# Patient Record
Sex: FEMALE | Race: WHITE | NOT HISPANIC OR LATINO
[De-identification: names, ages, dates, MRNs, and addresses within clinical notes are randomized per-mention and may not be internally consistent; named-entity substitution may affect disease eponyms.]

---

## 2018-03-19 PROBLEM — Z00.00 ENCOUNTER FOR PREVENTIVE HEALTH EXAMINATION: Status: ACTIVE | Noted: 2018-03-19

## 2018-03-30 ENCOUNTER — APPOINTMENT (OUTPATIENT)
Dept: CARDIOLOGY | Facility: CLINIC | Age: 48
End: 2018-03-30

## 2018-03-30 ENCOUNTER — MEDICATION RENEWAL (OUTPATIENT)
Age: 48
End: 2018-03-30

## 2018-04-13 ENCOUNTER — RECORD ABSTRACTING (OUTPATIENT)
Age: 48
End: 2018-04-13

## 2018-04-13 DIAGNOSIS — Z86.79 PERSONAL HISTORY OF OTHER DISEASES OF THE CIRCULATORY SYSTEM: ICD-10-CM

## 2018-04-13 DIAGNOSIS — Z78.9 OTHER SPECIFIED HEALTH STATUS: ICD-10-CM

## 2018-04-17 ENCOUNTER — APPOINTMENT (OUTPATIENT)
Dept: CARDIOLOGY | Facility: CLINIC | Age: 48
End: 2018-04-17
Payer: COMMERCIAL

## 2018-04-17 VITALS
DIASTOLIC BLOOD PRESSURE: 90 MMHG | RESPIRATION RATE: 16 BRPM | BODY MASS INDEX: 31.34 KG/M2 | HEIGHT: 66 IN | HEART RATE: 62 BPM | SYSTOLIC BLOOD PRESSURE: 144 MMHG | WEIGHT: 195 LBS

## 2018-04-17 PROCEDURE — 93000 ELECTROCARDIOGRAM COMPLETE: CPT

## 2018-04-17 PROCEDURE — 99214 OFFICE O/P EST MOD 30 MIN: CPT

## 2018-04-20 ENCOUNTER — APPOINTMENT (OUTPATIENT)
Dept: CARDIOLOGY | Facility: CLINIC | Age: 48
End: 2018-04-20
Payer: COMMERCIAL

## 2018-04-20 PROCEDURE — 93306 TTE W/DOPPLER COMPLETE: CPT

## 2018-04-23 ENCOUNTER — APPOINTMENT (OUTPATIENT)
Dept: CARDIOLOGY | Facility: CLINIC | Age: 48
End: 2018-04-23
Payer: COMMERCIAL

## 2018-04-23 PROCEDURE — 93015 CV STRESS TEST SUPVJ I&R: CPT

## 2021-08-18 DIAGNOSIS — Z82.49 FAMILY HISTORY OF ISCHEMIC HEART DISEASE AND OTHER DISEASES OF THE CIRCULATORY SYSTEM: ICD-10-CM

## 2021-08-18 RX ORDER — LISINOPRIL 10 MG/1
10 TABLET ORAL
Qty: 90 | Refills: 1 | Status: DISCONTINUED | COMMUNITY
Start: 2018-03-19 | End: 2021-08-18

## 2021-09-08 ENCOUNTER — APPOINTMENT (OUTPATIENT)
Dept: CARDIOLOGY | Facility: CLINIC | Age: 51
End: 2021-09-08
Payer: COMMERCIAL

## 2021-09-08 VITALS
DIASTOLIC BLOOD PRESSURE: 80 MMHG | BODY MASS INDEX: 34.23 KG/M2 | HEART RATE: 67 BPM | SYSTOLIC BLOOD PRESSURE: 140 MMHG | HEIGHT: 66 IN | WEIGHT: 213 LBS | RESPIRATION RATE: 16 BRPM

## 2021-09-08 DIAGNOSIS — J45.909 UNSPECIFIED ASTHMA, UNCOMPLICATED: ICD-10-CM

## 2021-09-08 DIAGNOSIS — R79.89 OTHER SPECIFIED ABNORMAL FINDINGS OF BLOOD CHEMISTRY: ICD-10-CM

## 2021-09-08 PROCEDURE — 99204 OFFICE O/P NEW MOD 45 MIN: CPT

## 2021-09-08 PROCEDURE — 93000 ELECTROCARDIOGRAM COMPLETE: CPT

## 2021-09-10 PROBLEM — J45.909 ASTHMA: Status: ACTIVE | Noted: 2018-04-13

## 2021-09-23 ENCOUNTER — APPOINTMENT (OUTPATIENT)
Dept: CARDIOLOGY | Facility: CLINIC | Age: 51
End: 2021-09-23
Payer: COMMERCIAL

## 2021-09-23 PROCEDURE — 93306 TTE W/DOPPLER COMPLETE: CPT

## 2021-10-21 ENCOUNTER — APPOINTMENT (OUTPATIENT)
Dept: CARDIOLOGY | Facility: CLINIC | Age: 51
End: 2021-10-21
Payer: COMMERCIAL

## 2021-10-21 ENCOUNTER — NON-APPOINTMENT (OUTPATIENT)
Age: 51
End: 2021-10-21

## 2021-10-21 VITALS
HEIGHT: 66 IN | BODY MASS INDEX: 34.23 KG/M2 | WEIGHT: 213 LBS | RESPIRATION RATE: 16 BRPM | SYSTOLIC BLOOD PRESSURE: 128 MMHG | HEART RATE: 58 BPM | DIASTOLIC BLOOD PRESSURE: 70 MMHG

## 2021-10-21 PROCEDURE — 93000 ELECTROCARDIOGRAM COMPLETE: CPT | Mod: 59

## 2021-10-21 PROCEDURE — 99214 OFFICE O/P EST MOD 30 MIN: CPT

## 2021-10-21 NOTE — REASON FOR VISIT
[FreeTextEntry1] : Mrs. Read is a pleasant 51-year-old white female with a past medical history significant for hypertension, diet-controlled diabetes mellitus, asthma and a recent syncopal episode, who presents for follow up evaluation.

## 2021-10-21 NOTE — PHYSICAL EXAM
[Well Developed] : well developed [Well Nourished] : well nourished [No Acute Distress] : no acute distress [Obese] : obese [Normal Conjunctiva] : normal conjunctiva [Normal Venous Pressure] : normal venous pressure [No Carotid Bruit] : no carotid bruit [Normal S1, S2] : normal S1, S2 [No Rub] : no rub [S4] : S4 [Clear Lung Fields] : clear lung fields [No Respiratory Distress] : no respiratory distress  [Soft] : abdomen soft [Normal Bowel Sounds] : normal bowel sounds [Normal Gait] : normal gait [No Edema] : no edema [No Rash] : no rash [No Skin Lesions] : no skin lesions [Moves all extremities] : moves all extremities [No Focal Deficits] : no focal deficits [Normal Speech] : normal speech [Alert and Oriented] : alert and oriented [Normal memory] : normal memory [de-identified] : I/VI systolic murmur

## 2021-10-21 NOTE — REVIEW OF SYSTEMS
[Dyspnea on exertion] : dyspnea during exertion [Chest Discomfort] : chest discomfort [Palpitations] : palpitations [Negative] : Heme/Lymph

## 2021-10-21 NOTE — DISCUSSION/SUMMARY
[FreeTextEntry1] : 1 - Hypertension:  blood pressure well controlled on current medications.  Advised to follow low sodium diet.\par \par 2 - Syncope/palpitations/lightheadedness/chest discomfort:  complaints of intermittent lightheadedness, shortness of breath, palpitations 'fluttering" and mild chest discomfort.  Has not had any further syncope since her one episode .  States it all started after receiving her first COVID-19 vaccine.  She experienced a lot of her symptoms while wearing her ambulatory event monitor.  Most of her symptoms occurred at rest.\par \par Echocardiogram:  EF of 60-65%. Pseudonormal pattern of LV diastolic filling.  Mild aortic valve sclerosis without stenosis.  Trace mitral valve regurgitation.  Mild mitral annular calcification.\par \par 30-day ambulatory event monitor:  Sinus rhythm with average HR of 70 bpm (max 120, min 50).  No atrial fibrillation, SVT, VT, pauses or heart blocks.  PVC burden <1%, PAC burden <1%.  \par \par Carotid duplex:  Normal exam without atheromatous findings or sonographic evidence for hemodynamically significant internal carotid artery stenosis.\par \par Will schedule Mrs. Read for an exercise stress test.  Advised to decrease caffeine intake, increase hydration, get adequate sleep, avoid standing for long periods of time and get up slowly.\par \par 3 - Follow up with Dr. Holder after testing is completed.

## 2021-10-21 NOTE — HISTORY OF PRESENT ILLNESS
[FreeTextEntry1] : Mrs. Read presents today with complaints of intermittent lightheadedness, shortness of breath, palpitations 'fluttering" and mild chest discomfort.  Has not had any further syncope since her one episode .  States it all started after receiving her first COVID-19 vaccine.  She experienced a lot of her symptoms while wearing her ambulatory event monitor.  Most of her symptoms occurred at rest.  Her PCP recently had her undergo carotid duplex which she had done at [facility].

## 2021-10-21 NOTE — CARDIOLOGY SUMMARY
[de-identified] : Sinus bradycardia at 58 bpm.  Normal intervals.  Low voltage.  No evidence of ischemia.

## 2021-12-01 ENCOUNTER — APPOINTMENT (OUTPATIENT)
Dept: CARDIOLOGY | Facility: CLINIC | Age: 51
End: 2021-12-01

## 2021-12-07 ENCOUNTER — RX RENEWAL (OUTPATIENT)
Age: 51
End: 2021-12-07

## 2021-12-23 ENCOUNTER — APPOINTMENT (OUTPATIENT)
Dept: CARDIOLOGY | Facility: CLINIC | Age: 51
End: 2021-12-23

## 2022-01-11 ENCOUNTER — APPOINTMENT (OUTPATIENT)
Dept: CARDIOLOGY | Facility: CLINIC | Age: 52
End: 2022-01-11

## 2022-01-24 ENCOUNTER — APPOINTMENT (OUTPATIENT)
Dept: CARDIOLOGY | Facility: CLINIC | Age: 52
End: 2022-01-24

## 2022-02-22 ENCOUNTER — APPOINTMENT (OUTPATIENT)
Dept: CARDIOLOGY | Facility: CLINIC | Age: 52
End: 2022-02-22
Payer: COMMERCIAL

## 2022-02-22 ENCOUNTER — NON-APPOINTMENT (OUTPATIENT)
Age: 52
End: 2022-02-22

## 2022-02-22 VITALS — DIASTOLIC BLOOD PRESSURE: 92 MMHG | SYSTOLIC BLOOD PRESSURE: 150 MMHG

## 2022-02-22 VITALS
WEIGHT: 210 LBS | BODY MASS INDEX: 33.75 KG/M2 | DIASTOLIC BLOOD PRESSURE: 98 MMHG | HEIGHT: 66 IN | HEART RATE: 69 BPM | SYSTOLIC BLOOD PRESSURE: 160 MMHG | RESPIRATION RATE: 16 BRPM

## 2022-02-22 DIAGNOSIS — R01.1 CARDIAC MURMUR, UNSPECIFIED: ICD-10-CM

## 2022-02-22 PROCEDURE — 99215 OFFICE O/P EST HI 40 MIN: CPT

## 2022-02-22 PROCEDURE — 93000 ELECTROCARDIOGRAM COMPLETE: CPT

## 2022-02-22 NOTE — REASON FOR VISIT
[FreeTextEntry1] : Mrs. Read is a pleasant 51-year-old white female with a past medical history significant for hypertension, diet-controlled diabetes mellitus, asthma and a remote syncopal episode, who presents for follow up evaluation.

## 2022-02-22 NOTE — ASSESSMENT
[FreeTextEntry1] : EKG 2/22/2022:  The EKG illustrates sinus rhythm, rate of 69 bpm, no significant ST-T wave changes and no signs of sinus tachycardia (which could be attributable to pericarditis/myocarditis);

## 2022-02-22 NOTE — REVIEW OF SYSTEMS
[SOB] : shortness of breath [Dyspnea on exertion] : dyspnea during exertion [Chest Discomfort] : chest discomfort [Negative] : Heme/Lymph [Dizziness] : dizziness

## 2022-02-22 NOTE — PHYSICAL EXAM
[Well Developed] : well developed [No Acute Distress] : no acute distress [Obese] : obese [Normal Conjunctiva] : normal conjunctiva [Normal Venous Pressure] : normal venous pressure [No Carotid Bruit] : no carotid bruit [Normal S1, S2] : normal S1, S2 [No Rub] : no rub [No Gallop] : no gallop [Murmur] : murmur [Clear Lung Fields] : clear lung fields [Good Air Entry] : good air entry [No Respiratory Distress] : no respiratory distress  [Soft] : abdomen soft [Non Tender] : non-tender [No Masses/organomegaly] : no masses/organomegaly [Normal Gait] : normal gait [No Edema] : no edema [No Cyanosis] : no cyanosis [No Clubbing] : no clubbing [No Rash] : no rash [No Skin Lesions] : no skin lesions [Moves all extremities] : moves all extremities [No Focal Deficits] : no focal deficits [Normal Speech] : normal speech [Alert and Oriented] : alert and oriented [Normal memory] : normal memory [de-identified] : Grade I/VI systolic murmur

## 2022-02-22 NOTE — HISTORY OF PRESENT ILLNESS
[FreeTextEntry1] : Mrs. Read presents today with complaints of intermittent lightheadedness, shortness of breath,  and mild chest discomfort. Has not had any further syncope since her one episode . Patient recently had COVID in December/January and reports these symptoms have worsened since that time. She had also experienced pleuritic-like chest pain upon deep inhalation but this seems to have resolved a day or two ago.  Reports taking her antihypertensive medication daily (Amlodipine 5 mg), but admits usually taking this around noon time and has not yet taken it today.  Today's BP is running the in the 150s to 160s over 90s.  She has also been under a great deal of stress lately;

## 2022-02-22 NOTE — DISCUSSION/SUMMARY
[FreeTextEntry1] : 1 - Hypertension: blood pressure not well controlled on current medications, although, she admits to usually taking this around noon time and she has not yet taken this med today. She has also been under a great deal of stress lately as well. Advised to follow low sodium diet.\par \par She may take an extra Amlodipine 5 mg today (10 mg) and go back to taking Amlodipine 5 mg daily, but recommended she begin taking this in the A.M.\par \par Continue  to monitor BP at home few days per week and bring log to f/u.\par \par Will consider augmenting current antihypertensive regimen if still elevated. \par \par 2 - Syncope/palpitations/lightheadedness/chest discomfort: complaints of intermittent lightheadedness, shortness of breath,  and mild chest discomfort. Has not had any further syncope since her one episode. States it all started after getting COVID-19 in December/January. She experiences most of her symptoms while at rest and on minimal exertion.\par \par Pleuritic-like chest discomfort appears to have resolved spontaneously couple days ago.  Despite this, will have patient undergo D-Dimer testing to r/o PE.\par \par Most recent Echocardiogram September 2021: EF of 60-65%. Pseudonormal pattern of LV diastolic filling. Mild aortic valve sclerosis without stenosis. Trace mitral valve regurgitation. Mild mitral annular calcification.\par \par Most recent 30-day ambulatory event monitor October 2021: Sinus rhythm with average HR of 70 bpm (max 120, min 50). No atrial fibrillation, SVT, VT, pauses or heart blocks. PVC burden <1%, PAC burden <1%. \par \par Most recent Carotid duplex August 2021: Normal exam without atheromatous findings or sonographic evidence for hemodynamically significant internal carotid artery stenosis.\par \par Will schedule Mrs. Read for a 2 day exercise gated Nuclear stress test. Advised to decrease caffeine intake, increase hydration, get adequate sleep, avoid standing for long periods of time and get up slowly.\par \par 3 - Follow up with Dr. Holder/Leena Ball NP in 4-6 weeks or PRN.

## 2022-02-24 ENCOUNTER — NON-APPOINTMENT (OUTPATIENT)
Age: 52
End: 2022-02-24

## 2022-02-24 ENCOUNTER — APPOINTMENT (OUTPATIENT)
Dept: CARDIOLOGY | Facility: CLINIC | Age: 52
End: 2022-02-24
Payer: COMMERCIAL

## 2022-02-24 PROCEDURE — 93015 CV STRESS TEST SUPVJ I&R: CPT

## 2022-02-24 PROCEDURE — A9500: CPT

## 2022-02-24 PROCEDURE — 78452 HT MUSCLE IMAGE SPECT MULT: CPT

## 2022-02-28 ENCOUNTER — APPOINTMENT (OUTPATIENT)
Dept: CARDIOLOGY | Facility: CLINIC | Age: 52
End: 2022-02-28

## 2022-04-01 ENCOUNTER — APPOINTMENT (OUTPATIENT)
Dept: CARDIOLOGY | Facility: CLINIC | Age: 52
End: 2022-04-01
Payer: COMMERCIAL

## 2022-04-01 ENCOUNTER — NON-APPOINTMENT (OUTPATIENT)
Age: 52
End: 2022-04-01

## 2022-04-01 VITALS
HEIGHT: 66 IN | HEART RATE: 76 BPM | WEIGHT: 202 LBS | DIASTOLIC BLOOD PRESSURE: 92 MMHG | RESPIRATION RATE: 16 BRPM | SYSTOLIC BLOOD PRESSURE: 154 MMHG | BODY MASS INDEX: 32.47 KG/M2

## 2022-04-01 PROCEDURE — 99214 OFFICE O/P EST MOD 30 MIN: CPT

## 2022-04-01 PROCEDURE — 93000 ELECTROCARDIOGRAM COMPLETE: CPT

## 2022-04-01 NOTE — DISCUSSION/SUMMARY
[FreeTextEntry1] : 1- Hypertension:  blood pressure submaximally controlled on current medications.  Will have Mrs. Read increase amlodipine to 5mg BID.  Will monitor pressure at home three times a week.  Bring log and home monitor at next visit for calibration.  Follow strict low sodium diet and continue with weight loss.\par \par 2 - Syncope/palpitations/lightheadedness/chest discomfort:  presents today without exertional chest pain or syncope.  States her shortness of breath has improved significantly, has rare palpitations, but does have mild, lightheadedness almost on a daily basis.  To date Mrs. Read has had a cardiac workup including echocardiogram, 30-day ambulatory event monitoring and carotid duplex.  All of which have been unremarkable.  Recently underwent nuclear stress test.  Recent SPECT myocardial perfusion imaging failed to reveal evidence of exercise induced reversible ischemia or fixed defects to suggest an antecedent infarction.  The patient was reassured.  She is to move and get up slowly, keep caffeine intake to a minimum and continue to hydrate well.\par \par 3 - Had recent blood work through PCPs office.  Will call to have results forwarded.\par \par 4 - Follow up in 6 weeks for a blood pressure check.\par \par

## 2022-04-01 NOTE — HISTORY OF PRESENT ILLNESS
[FreeTextEntry1] : Mrs. Read presents today without exertional chest pain or syncope.  States her shortness of breath has improved significantly, has rare palpitations, but does have mild, lightheadedness almost on a daily basis.  Has one cup of coffee in the morning and has been hydrating very well.  She has improved her diet significantly and is down 8 pounds from her visit in February.

## 2022-04-01 NOTE — PHYSICAL EXAM
[Well Developed] : well developed [Well Nourished] : well nourished [No Acute Distress] : no acute distress [Obese] : obese [Normal Conjunctiva] : normal conjunctiva [Normal Venous Pressure] : normal venous pressure [No Carotid Bruit] : no carotid bruit [Normal S1, S2] : normal S1, S2 [No Rub] : no rub [No Gallop] : no gallop [Clear Lung Fields] : clear lung fields [No Respiratory Distress] : no respiratory distress  [Soft] : abdomen soft [Normal Bowel Sounds] : normal bowel sounds [Normal Gait] : normal gait [No Edema] : no edema [No Rash] : no rash [No Skin Lesions] : no skin lesions [Moves all extremities] : moves all extremities [No Focal Deficits] : no focal deficits [Normal Speech] : normal speech [Alert and Oriented] : alert and oriented [Normal memory] : normal memory [de-identified] : I/VI systolic murmur

## 2022-04-01 NOTE — REASON FOR VISIT
[FreeTextEntry1] : Mrs. Read is a pleasant 51-year-old white female with a past medical history significant for hypertension, diet-controlled diabetes mellitus, asthma, remote syncopal episode, and recent COVID-19 infection, who presents for follow up evalution.

## 2022-05-12 ENCOUNTER — NON-APPOINTMENT (OUTPATIENT)
Age: 52
End: 2022-05-12

## 2022-05-12 ENCOUNTER — APPOINTMENT (OUTPATIENT)
Dept: CARDIOLOGY | Facility: CLINIC | Age: 52
End: 2022-05-12
Payer: COMMERCIAL

## 2022-05-12 VITALS
DIASTOLIC BLOOD PRESSURE: 86 MMHG | BODY MASS INDEX: 32.3 KG/M2 | RESPIRATION RATE: 16 BRPM | HEIGHT: 66 IN | SYSTOLIC BLOOD PRESSURE: 134 MMHG | HEART RATE: 65 BPM | WEIGHT: 201 LBS

## 2022-05-12 PROCEDURE — 99214 OFFICE O/P EST MOD 30 MIN: CPT

## 2022-05-12 PROCEDURE — 93000 ELECTROCARDIOGRAM COMPLETE: CPT

## 2022-05-12 RX ORDER — CETIRIZINE HYDROCHLORIDE 10 MG/1
CAPSULE, LIQUID FILLED ORAL
Refills: 0 | Status: ACTIVE | COMMUNITY

## 2022-05-12 NOTE — PHYSICAL EXAM
[Well Developed] : well developed [Well Nourished] : well nourished [No Acute Distress] : no acute distress [Obese] : obese [Normal Conjunctiva] : normal conjunctiva [Normal Venous Pressure] : normal venous pressure [No Carotid Bruit] : no carotid bruit [Normal S1, S2] : normal S1, S2 [No Rub] : no rub [No Gallop] : no gallop [Clear Lung Fields] : clear lung fields [No Respiratory Distress] : no respiratory distress  [Soft] : abdomen soft [Normal Bowel Sounds] : normal bowel sounds [Normal Gait] : normal gait [No Rash] : no rash [No Skin Lesions] : no skin lesions [Moves all extremities] : moves all extremities [No Focal Deficits] : no focal deficits [Normal Speech] : normal speech [Alert and Oriented] : alert and oriented [Normal memory] : normal memory [de-identified] : I/VI systolic murmur [de-identified] : Trace bilateral LE edema

## 2022-05-12 NOTE — REVIEW OF SYSTEMS
[Weight Loss (___ Lbs)] : [unfilled] ~Ulb weight loss [Negative] : Heme/Lymph [FreeTextEntry5] : See HPI [FreeTextEntry9] : See HPI

## 2022-05-12 NOTE — REASON FOR VISIT
[FreeTextEntry1] : Mrs. Read is a pleasant 51-year-old white female with a past medical history significant for hypertension, diet-controlled diabetes mellitus, asthma, remote syncopal episode, and recent COVID-19 infection, who presents for follow up evaluation.

## 2022-05-12 NOTE — DISCUSSION/SUMMARY
[FreeTextEntry1] : 1 - Hypertension:  blood pressure borderline controlled on current medications.  Has noted that she has been "retaining more water than usual" since increasing her amlodipine.  Will decrease amlodipine back to 5mg daily and will add valsartan 160mg daily.   Has also been experiencing lightheadedness when she gets up quickly.  Has been occurring on a daily basis.  Hydrates well.  Did orthostatic blood pressures on patient, supine 152/88, sitting 150/82, standing 146/84.  Patient will monitor blood pressure at home three times a week.  Will bring numbers and home monitor at next visit for calibration.\par \par 2 - Lightheadedness/chest discomfort:  patient advised to hydrate well and get up slowly from laying down or sitting position.  Recent cardiac workup was unremarkable.  Patient is scheduled to see her gastroenterologist later this month.\par \par 3 - Recent labs from PCPs office (3/28/2022):  glucose 117, potassium 4.4, BUN 13, creatinine 0.87, cholesterol 178, HDL 56, , triglycerides 44, TC/HDL 3.2, HgA1c 5.8, TSH 3.59, H/H 135./39.8, platelets 217, Vitamin D 79.9.\par \par 4 - Follow up in 6 weeks.

## 2022-05-12 NOTE — HISTORY OF PRESENT ILLNESS
[FreeTextEntry1] : Mrs. Read presents today with complaints of mid-sternal chest discomfort, not related to exertion.  Occurs mostly when sitting at her desk.  It is not reproducible with palpation.  It "feels like something is stuck in my chest and it nags around for about a half hour."  She may have intermittent shortness of breath with this chest discomfort.  Has also been experiencing lightheadedness when she gets up quickly.  Has been occurring on a daily basis.  Hydrates well.  Has noted that she has been "retaining more water than usual" since increasing her amlodipine.  Has been having bilateral leg cramping at night, but mostly in the front of the leg (shin area).

## 2022-05-18 RX ORDER — KIT FOR THE PREPARATION OF TECHNETIUM TC99M SESTAMIBI 1 MG/5ML
INJECTION, POWDER, LYOPHILIZED, FOR SOLUTION PARENTERAL
Refills: 0 | Status: COMPLETED | OUTPATIENT
Start: 2022-05-18

## 2022-05-18 RX ADMIN — KIT FOR THE PREPARATION OF TECHNETIUM TC99M SESTAMIBI 0: 1 INJECTION, POWDER, LYOPHILIZED, FOR SOLUTION PARENTERAL at 00:00

## 2022-06-13 ENCOUNTER — RX RENEWAL (OUTPATIENT)
Age: 52
End: 2022-06-13

## 2022-06-23 ENCOUNTER — APPOINTMENT (OUTPATIENT)
Dept: CARDIOLOGY | Facility: CLINIC | Age: 52
End: 2022-06-23
Payer: COMMERCIAL

## 2022-06-23 ENCOUNTER — NON-APPOINTMENT (OUTPATIENT)
Age: 52
End: 2022-06-23

## 2022-06-23 VITALS
RESPIRATION RATE: 15 BRPM | SYSTOLIC BLOOD PRESSURE: 122 MMHG | HEART RATE: 66 BPM | HEIGHT: 66 IN | DIASTOLIC BLOOD PRESSURE: 74 MMHG

## 2022-06-23 DIAGNOSIS — R42 DIZZINESS AND GIDDINESS: ICD-10-CM

## 2022-06-23 DIAGNOSIS — R06.02 SHORTNESS OF BREATH: ICD-10-CM

## 2022-06-23 DIAGNOSIS — R07.9 CHEST PAIN, UNSPECIFIED: ICD-10-CM

## 2022-06-23 DIAGNOSIS — E11.9 TYPE 2 DIABETES MELLITUS W/OUT COMPLICATIONS: ICD-10-CM

## 2022-06-23 DIAGNOSIS — R00.2 PALPITATIONS: ICD-10-CM

## 2022-06-23 PROCEDURE — 99214 OFFICE O/P EST MOD 30 MIN: CPT

## 2022-06-23 PROCEDURE — 93000 ELECTROCARDIOGRAM COMPLETE: CPT

## 2022-06-23 RX ORDER — OMEPRAZOLE 40 MG/1
40 CAPSULE, DELAYED RELEASE ORAL
Qty: 90 | Refills: 1 | Status: ACTIVE | COMMUNITY
Start: 2022-06-23 | End: 1900-01-01

## 2022-06-23 RX ORDER — PSYLLIUM HUSK 0.4 G
CAPSULE ORAL
Refills: 0 | Status: ACTIVE | COMMUNITY

## 2022-06-23 RX ORDER — VITAMIN B COMPLEX
CAPSULE ORAL
Refills: 0 | Status: ACTIVE | COMMUNITY

## 2022-06-23 RX ORDER — OMEGA-3/DHA/EPA/FISH OIL 300-1000MG
CAPSULE ORAL
Refills: 0 | Status: ACTIVE | COMMUNITY

## 2022-06-23 NOTE — PHYSICAL EXAM
[Well Developed] : well developed [Well Nourished] : well nourished [No Acute Distress] : no acute distress [Obese] : obese [Normal Conjunctiva] : normal conjunctiva [Normal Venous Pressure] : normal venous pressure [No Carotid Bruit] : no carotid bruit [Normal S1, S2] : normal S1, S2 [No Rub] : no rub [No Gallop] : no gallop [Clear Lung Fields] : clear lung fields [No Respiratory Distress] : no respiratory distress  [Soft] : abdomen soft [Normal Bowel Sounds] : normal bowel sounds [Normal Gait] : normal gait [No Rash] : no rash [No Skin Lesions] : no skin lesions [Moves all extremities] : moves all extremities [No Focal Deficits] : no focal deficits [Normal Speech] : normal speech [Alert and Oriented] : alert and oriented [Normal memory] : normal memory [No Edema] : no edema [de-identified] : I/VI systolic murmur

## 2022-06-23 NOTE — HISTORY OF PRESENT ILLNESS
[FreeTextEntry1] : Mrs. Read presents today with continuing intermittent chest discomfort "feels like someone is choking me."  This can occur 2-3 times a week and is not related to exertion.  Can experience occasional shortness of breath, rare palpitations and lightheadedness.  She recently underwent a cardiac workup including echocardiography, nuclear stress testing and 30-day ambulatory event monitoring which was unremarkable for structural heart disease, coronary artery disease or cardiac arrhythmias.  She has been trying to walk regularly and improve her diet.  States she feels so much better when she does this.  She was scheduled to see her gastroenterologist last month and cancelled her appointment.

## 2022-06-23 NOTE — REASON FOR VISIT
"Bariatric Surgery     LOS: 2 days   Patient Care Team:  Parish Johnson MD as PCP - General (Family Medicine)    Chief Complaint:  POD #2    Subjective     Interval History:  Doing better than yesterday with pain control, but nausea persists.  Trying to use oral nausea meds.  Has had a few sips of protein drink, but overall only 4 oz fluid all day by mouth.    No fevers.  Pain controlled.  Ambulating.  Voiding.  Using IS.  Does not feel ready to go home now.    Objective     Vital Signs  Blood pressure 135/91, pulse 89, temperature 98.1 °F (36.7 °C), temperature source Oral, resp. rate 16, height 168.9 cm (66.5\"), weight (!) 150 kg (330 lb 8.2 oz), SpO2 97 %, unknown if currently breastfeeding.    Physical Exam:  General: Alert, NAD  Lungs: Clear  Heart: RRR  Abdomen: soft, appropriate, incisions okay, +BS  Extremities: warm, (+) SCDs     Results Review:     I reviewed the patient's new clinical results.    Labs:  Lab Results (last 24 hours)     Procedure Component Value Units Date/Time    Comprehensive Metabolic Panel [[id number]]  (Abnormal) Collected:  10/05/18 0448    Specimen:  Blood Updated:  10/05/18 0555     Glucose 73 mg/dL      BUN 7 (L) mg/dL      Creatinine 0.51 (L) mg/dL      Sodium 136 mmol/L      Potassium 3.9 mmol/L      Chloride 104 mmol/L      CO2 26.0 mmol/L      Calcium 8.2 (L) mg/dL      Total Protein 5.8 g/dL      Albumin 3.46 g/dL      ALT (SGPT) 14 U/L      AST (SGOT) 16 U/L      Alkaline Phosphatase 40 U/L      Total Bilirubin 0.6 mg/dL      eGFR Non African Amer 139 mL/min/1.73      Globulin 2.3 gm/dL      A/G Ratio 1.5 g/dL      BUN/Creatinine Ratio 13.7     Anion Gap 6.0 mmol/L     Narrative:       [employer] Guidelines    Stage     Description        GFR  1         Normal or High     90+  2         Mild decrease      60-89  3         Moderate decrease  30-59  4         Severe decrease    15-29  5         Kidney failure     <15    The MDRD GFR formula is only valid for " adults with stable renal function between ages 18 and 70.    CBC & Differential [[id number]] Collected:  10/05/18 0448    Specimen:  Blood Updated:  10/05/18 0521    Narrative:       The following orders were created for panel order CBC & Differential.  Procedure                               Abnormality         Status                     ---------                               -----------         ------                     CBC Auto Differential[[id number]]        Normal              Final result                 Please view results for these tests on the individual orders.    CBC Auto Differential [[id number]]  (Normal) Collected:  10/05/18 0448    Specimen:  Blood Updated:  10/05/18 0521     WBC 8.95 10*3/mm3      RBC 4.58 10*6/mm3      Hemoglobin 12.7 g/dL      Hematocrit 39.4 %      MCV 86.0 fL      MCH 27.7 pg      MCHC 32.2 g/dL      RDW 13.7 %      RDW-SD 43.0 fl      MPV 11.2 fL      Platelets 163 10*3/mm3      Neutrophil % 61.6 %      Lymphocyte % 28.0 %      Monocyte % 9.4 %      Eosinophil % 0.8 %      Basophil % 0.2 %      Immature Grans % 0.3 %      Neutrophils, Absolute 5.51 10*3/mm3      Lymphocytes, Absolute 2.51 10*3/mm3      Monocytes, Absolute 0.84 10*3/mm3      Eosinophils, Absolute 0.07 10*3/mm3      Basophils, Absolute 0.02 10*3/mm3      Immature Grans, Absolute 0.03 10*3/mm3     Tissue Pathology Exam [[id number]] Collected:  10/03/18 0834    Specimen:  Tissue from Stomach Updated:  10/04/18 1549     Case Report --     Surgical Pathology Report                         Case: [id number]                                  Authorizing Provider:  Abelino Powell MD    Collected:           10/03/2018 08:34 AM          Ordering Location:     [facility]   Received:            10/03/2018 10:50 AM                                 OR                                                                           Pathologist:           Reji Ying MD                                                         Specimen:    Stomach, SUB-TOTAL GASTRECTOMY                                                              Clinical Information --     The working history is morbid obesity and hiatal hernia.        Final Diagnosis --     STOMACH, SUBTOTAL GASTRECTOMY:  Gastric tissue with no significant histopathologic change.   Negative for intestinal metaplasia, dysplasia and significant inflammation.   No Helicobacter pylori-like organisms identified on routine stains.       PCC/klb        Gross Description --     Received in formalin labeled as subtotal gastrectomy is a 20 cm in length stapled portion of stomach with a circumference ranging from 1.5 to 9.5 cm. The serosa is smooth, pink, glistening and displays scattered hemorrhage. The lumen contains a minimal amount of dark red viscid material. The mucosa is tan/red with a normal rugal folding pattern. No polyps, masses or ulcers are grossly identified. Representative sections are submitted in blocks 1A-1C. LED/bryant        Microscopic Description --     The slides are reviewed and demonstrate histopathologic features supporting the above rendered diagnosis.                Imaging:  Imaging Results (last 24 hours)     ** No results found for the last 24 hours. **            Assessment/Plan     POD # 2 s/p LSG/HHR.    Doing fairly well.  Pain is controlled, but nausea persists and has only had 4 oz. Oral intake so far today.  Continue inpatient for now for IVF, nausea control.  Labs reviewed, calcium slightly low.    Continue OOB/ PT/ DVT prophx.  Discussed plan for increasing oral intake every few hours until she can reliably get down 4 oz per hour.      Ashley Moss MD  10/05/18  2:14 PM         [FreeTextEntry1] : Mrs. Read is a pleasant 52-year-old white female with a past medical history significant for hypertension, diet-controlled diabetes mellitus, asthma, remote syncopal episode, and recent COVID-19 infection, who presents for follow up evaluation.

## 2022-06-23 NOTE — DISCUSSION/SUMMARY
[FreeTextEntry1] : 1 - Hypertension:  blood pressure well controlled on current medications.  Advised to follow low sodium diet.\par \par 2 - Chest discomfort/palpitations/shortness of breath/palpitations:   presents today with continuing intermittent chest discomfort "feels like someone is choking me."  This can occur 2-3 times a week and is not related to exertion.  Can experience occasional shortness of breath, rare palpitations and lightheadedness.  She recently underwent a cardiac workup including echocardiography, nuclear stress testing and 30-day ambulatory event monitoring which was unremarkable for structural heart disease, coronary artery disease or cardiac arrhythmias.  She has been trying to walk regularly and improve her diet.  States she feels so much better when she does this.  She was scheduled to see her gastroenterologist last month and cancelled her appointment.  I have prescribed omeprazole 40mg daily.  Patient to reschedule her follow up with GI.\par \par 3 - Follow up with Dr. Holder in 3 months.

## 2022-09-22 ENCOUNTER — RESULT CHARGE (OUTPATIENT)
Age: 52
End: 2022-09-22

## 2022-09-23 ENCOUNTER — APPOINTMENT (OUTPATIENT)
Dept: CARDIOLOGY | Facility: CLINIC | Age: 52
End: 2022-09-23

## 2022-09-23 VITALS
DIASTOLIC BLOOD PRESSURE: 78 MMHG | RESPIRATION RATE: 16 BRPM | BODY MASS INDEX: 32.3 KG/M2 | HEIGHT: 66 IN | WEIGHT: 201 LBS | SYSTOLIC BLOOD PRESSURE: 120 MMHG | HEART RATE: 50 BPM

## 2022-09-23 PROCEDURE — 99214 OFFICE O/P EST MOD 30 MIN: CPT | Mod: 25

## 2022-09-23 PROCEDURE — 93000 ELECTROCARDIOGRAM COMPLETE: CPT

## 2022-09-23 RX ORDER — VALSARTAN 160 MG/1
160 TABLET, COATED ORAL DAILY
Qty: 90 | Refills: 1 | Status: DISCONTINUED | COMMUNITY
Start: 2022-05-12 | End: 2022-09-23

## 2022-09-26 NOTE — PHYSICAL EXAM
[Well Developed] : well developed [Well Nourished] : well nourished [No Acute Distress] : no acute distress [Obese] : obese [Normal Conjunctiva] : normal conjunctiva [Normal Venous Pressure] : normal venous pressure [No Carotid Bruit] : no carotid bruit [Normal S1, S2] : normal S1, S2 [No Rub] : no rub [No Gallop] : no gallop [Clear Lung Fields] : clear lung fields [No Respiratory Distress] : no respiratory distress  [Soft] : abdomen soft [Normal Bowel Sounds] : normal bowel sounds [Normal Gait] : normal gait [No Edema] : no edema [No Rash] : no rash [No Skin Lesions] : no skin lesions [Moves all extremities] : moves all extremities [No Focal Deficits] : no focal deficits [Normal Speech] : normal speech [Alert and Oriented] : alert and oriented [Normal memory] : normal memory [de-identified] : I/VI systolic murmur

## 2022-09-26 NOTE — REASON FOR VISIT
[FreeTextEntry1] : Mrs. Read is a pleasant 52-year-old white female with a past medical history significant for hypertension, diet-controlled diabetes mellitus, asthma, and remote syncope, who presents for follow up evaluation. \par \par \par

## 2022-09-26 NOTE — HISTORY OF PRESENT ILLNESS
[FreeTextEntry1] : From a cardiac standpoint, Mrs. Read feels well denying exertional chest pain, shortness of breath, palpitations, lightheadedness, and syncope.  She states she is currently losing weight by undergoing intermittent fasting.

## 2022-09-26 NOTE — ASSESSMENT
[FreeTextEntry1] : 1.  EKG today reveals sinus bradycardia at 50 bpm.  Normal intervals.  No evidence of ischemia.  \par \par 2.  Hypertension:  Blood pressure well controlled at this time on current medications.  Patient states that she is only taking Amlodipine 5 mg daily and no longer taking Valsartan 160 mg daily.  I have advised patient to continue with Amlodipine and reassess her blood pressure periodically. \par \par 3.  History of syncope:  Patient without further episodes despite resting bradycardia on EKG of 50.  Has undergone 30-day ambulatory event monitoring which has also demonstrated bradycardic rates in the 50s in the past.  For now, she is advised on a low-fat / low-cholesterol / low-salt diet and regular aerobic exercise.  If clinically stable, follow up office visit six months. \par

## 2022-11-16 ENCOUNTER — APPOINTMENT (OUTPATIENT)
Dept: ORTHOPEDIC SURGERY | Facility: CLINIC | Age: 52
End: 2022-11-16

## 2022-11-16 VITALS — WEIGHT: 201 LBS | BODY MASS INDEX: 32.3 KG/M2 | HEIGHT: 66 IN

## 2022-11-16 DIAGNOSIS — M23.91 UNSPECIFIED INTERNAL DERANGEMENT OF RIGHT KNEE: ICD-10-CM

## 2022-11-16 DIAGNOSIS — Z78.9 OTHER SPECIFIED HEALTH STATUS: ICD-10-CM

## 2022-11-16 DIAGNOSIS — M23.92 UNSPECIFIED INTERNAL DERANGEMENT OF LEFT KNEE: ICD-10-CM

## 2022-11-16 PROCEDURE — 20610 DRAIN/INJ JOINT/BURSA W/O US: CPT

## 2022-11-16 PROCEDURE — 99213 OFFICE O/P EST LOW 20 MIN: CPT | Mod: 25

## 2022-11-16 NOTE — ASSESSMENT
[FreeTextEntry1] : The left knee has failed more than 6 weeks of other physician directed care and continues to have pain and dysfunction. will send for mri of the left knee and f/u\par \par the right knee is acute onset pain this past weekend and is limiting will administer csi right knee now and see how she does\par \par reviewed outside xrays and dopplers of b/l knees and lower extremities from [facility], [place] the left knee reports dates in October and the right leg reports dated 11/15/2022

## 2022-11-16 NOTE — PROCEDURE
[Large Joint Injection] : Large joint injection [Right] : of the right [Knee] : knee [Pain] : pain [Inflammation] : inflammation [Alcohol] : alcohol [Betadine] : betadine [Ethyl Chloride sprayed topically] : ethyl chloride sprayed topically [Sterile technique used] : sterile technique used [___ cc    6mg] :  Betamethasone (Celestone) ~Vcc of 6mg [___ cc    1%] : Lidocaine ~Vcc of 1%  [] : Patient tolerated procedure well [Call if redness, pain or fever occur] : call if redness, pain or fever occur [Apply ice for 15min out of every hour for the next 12-24 hours as tolerated] : apply ice for 15 minutes out of every hour for the next 12-24 hours as tolerated [Patient was advised to rest the joint(s) for ____ days] : patient was advised to rest the joint(s) for [unfilled] days [Previous OTC use and PT nontherapeutic] : patient has tried OTC's including aspirin, Ibuprofen, Aleve, etc or prescription NSAIDS, and/or exercises at home and/or physical therapy without satisfactory response [Patient had decreased mobility in the joint] : patient had decreased mobility in the joint [Risks, benefits, alternatives discussed / Verbal consent obtained] : the risks benefits, and alternatives have been discussed, and verbal consent was obtained

## 2022-11-16 NOTE — PHYSICAL EXAM
[Left] : left knee [Right] : right knee [NL (0)] : extension 0 degrees [5___] : hamstring 5[unfilled]/5 [Positive] : positive Valerie [] : no lateral facet of patella tenderness [TWNoteComboBox7] : flexion 110 degrees

## 2022-11-16 NOTE — REASON FOR VISIT
[FreeTextEntry2] : 11/16/2022 :ROCÍO BELLA , a 52 year old female, presents today for bilateral knee pain, injured left knee Labor day weekend, went to PCP had xrays and doppler both were negative. No treatment since. \par
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
## 2022-11-30 ENCOUNTER — APPOINTMENT (OUTPATIENT)
Dept: ORTHOPEDIC SURGERY | Facility: CLINIC | Age: 52
End: 2022-11-30

## 2023-03-20 ENCOUNTER — NON-APPOINTMENT (OUTPATIENT)
Age: 53
End: 2023-03-20

## 2023-03-20 ENCOUNTER — APPOINTMENT (OUTPATIENT)
Dept: CARDIOLOGY | Facility: CLINIC | Age: 53
End: 2023-03-20
Payer: COMMERCIAL

## 2023-03-20 VITALS
HEART RATE: 63 BPM | WEIGHT: 206 LBS | RESPIRATION RATE: 16 BRPM | SYSTOLIC BLOOD PRESSURE: 152 MMHG | BODY MASS INDEX: 33.11 KG/M2 | HEIGHT: 66 IN | DIASTOLIC BLOOD PRESSURE: 90 MMHG

## 2023-03-20 DIAGNOSIS — I10 ESSENTIAL (PRIMARY) HYPERTENSION: ICD-10-CM

## 2023-03-20 DIAGNOSIS — R55 SYNCOPE AND COLLAPSE: ICD-10-CM

## 2023-03-20 DIAGNOSIS — E66.9 OBESITY, UNSPECIFIED: ICD-10-CM

## 2023-03-20 DIAGNOSIS — R00.1 BRADYCARDIA, UNSPECIFIED: ICD-10-CM

## 2023-03-20 PROCEDURE — 99214 OFFICE O/P EST MOD 30 MIN: CPT | Mod: 25

## 2023-03-20 PROCEDURE — 93000 ELECTROCARDIOGRAM COMPLETE: CPT

## 2023-03-20 RX ORDER — CHOLECALCIFEROL (VITAMIN D3) 1250 MCG
1.25 MG CAPSULE ORAL
Qty: 12 | Refills: 1 | Status: ACTIVE | COMMUNITY
Start: 2021-09-08 | End: 1900-01-01

## 2023-04-02 PROBLEM — E66.9 OBESITY: Status: ACTIVE | Noted: 2018-04-13

## 2023-04-02 PROBLEM — I10 HTN (HYPERTENSION): Status: ACTIVE | Noted: 2018-03-30

## 2023-04-02 PROBLEM — R00.1 BRADYCARDIA: Status: ACTIVE | Noted: 2022-09-23

## 2023-04-02 PROBLEM — R55 SYNCOPE: Status: ACTIVE | Noted: 2021-09-08

## 2023-04-02 NOTE — PHYSICAL EXAM
[Well Developed] : well developed [Well Nourished] : well nourished [No Acute Distress] : no acute distress [Obese] : obese [Normal Conjunctiva] : normal conjunctiva [Normal Venous Pressure] : normal venous pressure [No Carotid Bruit] : no carotid bruit [Normal S1, S2] : normal S1, S2 [No Rub] : no rub [No Gallop] : no gallop [Clear Lung Fields] : clear lung fields [No Respiratory Distress] : no respiratory distress  [Soft] : abdomen soft [Normal Bowel Sounds] : normal bowel sounds [Normal Gait] : normal gait [No Edema] : no edema [No Rash] : no rash [No Skin Lesions] : no skin lesions [Moves all extremities] : moves all extremities [No Focal Deficits] : no focal deficits [Normal Speech] : normal speech [Alert and Oriented] : alert and oriented [Normal memory] : normal memory [de-identified] : I/VI systolic murmur

## 2023-04-02 NOTE — DISCUSSION/SUMMARY
[FreeTextEntry1] : 1 - Hypertension:  blood pressure submaximally controlled on current medications.  Recently started Ozempic which was prescribed by her PCP.  She would like to monitor her pressure at home and work on weight loss.  Will bring numbers and home monitor at next visit for calibration.  Advised to follow strict low sodium diet.\par \par 2 - History of syncope:  patient has not had any further episodes of syncope or lightheadedness.  EKG today reveals sinus rhythm at 63 bpm.\par \par 3 - Will call PCPs office to have recent labs forwarded to our office.\par \par 4 - Follow up with Dr. Holder in 3 months. [EKG obtained to assist in diagnosis and management of assessed problem(s)] : EKG obtained to assist in diagnosis and management of assessed problem(s)

## 2023-04-02 NOTE — HISTORY OF PRESENT ILLNESS
[FreeTextEntry1] : Mrs. Read presents today without complaints of exertional chest pain, shortness of breath, palpitations, lightheadedness or syncope.

## 2023-06-09 ENCOUNTER — APPOINTMENT (OUTPATIENT)
Dept: CARDIOLOGY | Facility: CLINIC | Age: 53
End: 2023-06-09

## 2023-10-16 ENCOUNTER — RX RENEWAL (OUTPATIENT)
Age: 53
End: 2023-10-16

## 2023-10-16 RX ORDER — AMLODIPINE BESYLATE 5 MG/1
5 TABLET ORAL DAILY
Qty: 90 | Refills: 1 | Status: ACTIVE | COMMUNITY
Start: 2023-10-16 | End: 1900-01-01

## 2024-02-05 ENCOUNTER — NON-APPOINTMENT (OUTPATIENT)
Age: 54
End: 2024-02-05